# Patient Record
Sex: MALE | Race: WHITE | Employment: OTHER | URBAN - METROPOLITAN AREA
[De-identification: names, ages, dates, MRNs, and addresses within clinical notes are randomized per-mention and may not be internally consistent; named-entity substitution may affect disease eponyms.]

---

## 2022-04-11 ENCOUNTER — HOSPITAL ENCOUNTER (OUTPATIENT)
Dept: PHYSICAL THERAPY | Facility: CLINIC | Age: 77
Setting detail: THERAPIES SERIES
Discharge: HOME | End: 2022-04-11
Payer: MEDICARE

## 2022-04-11 PROCEDURE — 97110 THERAPEUTIC EXERCISES: CPT

## 2022-04-11 PROCEDURE — 97161 PT EVAL LOW COMPLEX 20 MIN: CPT

## 2022-04-11 NOTE — CONSULTS
[] [facility]) - [facility] &  Therapy  [address]  [phone]  F: [phone] [] [address]  [facility]   [address]  P: [phone]  F: [phone] [] [address] &  Therapy  [address]  P: [phone]  F: [phone] [x] [address]  P: [phone]  F: [phone] [] [address]  [facility]   [address]   [place]: [phone]  F: [phone]      Physical Therapy Upper Extremity Evaluation    Date:  2022  Patient: Jaylyn Jimenez  : 1945  MRN: [id number]  Physician: Cheli Elizondo   Insurance: MEDICARE  Medical Diagnosis:   S70.01XA (ICD-10-CM) - Contusion of right hip, initial encounter   S70.11XA (ICD-10-CM) - Contusion of right thigh, initial encounter  S40.011A (ICD-10-CM) - Contusion of right shoulder, initial encounter  Rehab Codes: M25. 511  Onset date: 3/31/2022    Next 's appt.: N/A    Subjective:     CC: Right shoulder pain s/p a fall      HPI: The patient states that he had fallen off a platform surface while at a local bar a few weeks ago. The patient reports no prior fall or gait instability. The patient had fallen onto his right hip and shoulder and denies hitting his head. The patient reports that he had bruising and soreness of the right hip and shoulder and is concerned that this may effect his golf game. No images were taken for this episode as symptoms were minor. Today, the patient only has symptoms into his right shoulder that is described as achy with certain movement (reaching). Patient reports that he has not been exercising because of fear of hurting it worse but would like to return to the gym.          PMHx: [] Unremarkable [x] Diabetes [] HTN  [] Pacemaker   [] MI/Heart Problems [] Cancer [] Arthritis [] Other:              [] Refer to full medical chart  In EPIC       Comorbidities:   [] Obesity [] Dialysis  [] N/A   [] Asthma/COPD [] Dementia [] Other:   [] Stroke [] Sleep apnea [] Other:   [] Vascular disease [] Rheumatic disease [] Other:     Tests: [] X-Ray: [] MRI:  [] Other:    Medications: [x] Refer to full medical record [] None [] Other:  Allergies:      [x] Refer to full medical record [] None [] Other:    Function:  Hand Dominance  [x] Right  [] Left  Patient lives with: Wife    In what type of home []  One story   [x] Two story   [] Split level   Number of stairs to enter 12   With handrail on the [x]  Right to enter   [] Left to enter   Bathroom has a []  Tub only  [x] Tub/shower combo   [] Walk in shower    []  Grab bars   Washing machine is on []  Main level   [] Second level   [] Basement   Employer Retired    Job Status []  Normal duty   [] Light duty   [] Off due to condition    [x]  Retired   [] Not employed   [] Disability  [] Other:  []  Return to work:    Work activities/duties        ADL/IADL Previous level of function Current level of function Modifications made  Who currently assists the patient with task   Bathing  [x] Independent  [] Assist [x] Independent  [] Assist     Dress/grooming [x] Independent  [] Assist [x] Independent  [] Assist     Transfer/mobility [x] Independent  [] Assist [x] Independent  [] Assist     Feeding [x] Independent  [] Assist [x] Independent  [] Assist     Toileting [x] Independent  [] Assist [x] Independent  [] Assist     Driving [x] Independent  [] Assist [x] Independent  [] Assist     Housekeeping [x] Independent  [] Assist [x] Independent  [] Assist     Grocery shop/meal prep [x] Independent  [] Assist [x] Independent  [] Assist       Gait Prior level of function Current level of function    [x] Independent  [] Assist [x] Independent  [] Assist   Device: [] Independent [] Independent    [] Straight Cane [] Quad cane [] Straight Cane [] Quad cane    [] Standard walker [] Rolling walker   [] 4 wheeled walker [] Standard walker [] Rolling walker   [] 4 wheeled walker    [] Wheelchair [] Wheelchair     Pain:  [x] Yes  [] No Location: Right shoulder, hip and thigh  Pain Rating: (0-10 scale) 0/10; 7/10 right after the fall  Pain altered Tx:  [] Yes  [] No  Action:    Symptoms:  [x] Improving [] Worsening [] Same  Better:  [] AM    [] PM    [] Sit    [] Rise/Sit    []Stand    [] Walk    [] Lying    [] Other:  Worse: [] AM    [] PM    [] Sit    [] Rise/Sit    []Stand    [] Walk    [] Lying    [] Bend                      [] Valsalva    [] Other:  Sleep: [x] OK    [] Disturbed    Objective:       OBSERVATION No Deficit Deficit Not Tested Comments   Forward Head [] [x] []    Rounded Shoulders [] [x] []    Kyphosis [] [] []    Scapular Height/Position [] [x] [] Right shoulder depressed    Winging [] [x] [] Min bilaterally    Scapulohumeral Rhythm [] [] [x]    INSPECTION/PALPATION       SC/AC Joint [x] [] []    Supraspinatus [x] [] []    Biceps tendon/groove [] [x] []    Posterior shld [] [x] []    Subscapularis [x] [] []    NEUROLOGICAL       Cervical ROM/Quadrant [x] [] []    Reflexes [x] [] []    Compression/Distraction [x] [] []    Sensation [x] [] []           ROM  °A/P END FEEL STRENGTH TESTS (+/-) Left Right Not Tested    Left Right  Left Right    []   Cervical       Cervical Compression      Flexion WNL     Cervical Distraction      Extension WNL     Spurling's A      Rotation WNL WNL    Rene       Side Bending WNL WNL    Neer's      Upper Extremity      Painful Arc  +    Shoulder flexion 150 150  4+ 4+ Hornblower's       Shoulder Abduction 150 150*  4+ 4+ Bearhug      Shoulder Extension      Lift off       Shoulder IR T10 T8  4+ 4+ Empty can      Shoulder ER 50 50  4+ 4+ Yergasons      Elbow Flexion      Speeds      Elbow Extension      Apprehension      Pronation      Crank       Supination      Biceps Load       Wrist flexion      O'Briens Wrist extension            Radial Deviation            Ulnar Deviation             Strength            Level 1:            Level 2:            Level 3:            Level 4:            Level 5:            Scapular             Upper Trap            Middle Trap            Rhomboids     3 3       Low Trap              Functional Test: UEFI Score: 22.5% functionally impaired     Comments:   Dynamic Gait Index = 24    Assessment: The patient is a 75 y/o male that presents with right shoulder pain following a fall. The patient presents with impairments of increased pain, painful ROM, decreased scapular strength, postural dysfunction and deconditioning. The patient signs and symptoms may be consistent with right shoulder impingement secondary to fall. The patient demonstrates a low fall risk as demonstrated by his dynamic gait index score. The patient will benefit from therapy services to address the above impairments to return to previous level of activity. Problems:    [x] ? Pain:  [x] ? ROM:  [x] ? Strength:  [x] ? Function:  [] Other:      STG: (to be met in 3 treatments)  1. ? Pain: Patient will decrease pain to < or = to 1/10.   2. ? ROM: Patient will demonstrate pain free symmetrical UE ROM   3. ? Strength: Patient will demonstrate bilateral rhomboid strength to < or = to 4/5.   4. ? Function: Patient will be able to return to recreational exercise. 5. Patient to be independent with home exercise program as demonstrated by performance with correct form without cues. 6. Demonstrate Knowledge of fall prevention  LTG: (to be met in 6 treatments)  1. Patient will be able to complete > or = to 25 golf swings without pain. 2. Patient will decrease UEFI score to < or = to 18% to improve QOL.                     Patient goals: Return to golf     Rehab Potential:  [x] Good  [] Fair  [] Poor   Suggested Professional Referral:  [x] No  [] Yes:  Barriers to Goal Achievement:  [x] No  [] Yes:  Domestic Concerns:  [x] No  [] Yes:    Pt. Education:  [x] Plans/Goals, Risks/Benefits discussed  [x] Home exercise program  Method of Education: [x] Verbal  [x] Demo  [x] Written    Access Code: [email or web address]  URL: [email or web address]  Date: 04/11/2022  Prepared by: Tamala Collet    Exercises  Seated Scapular Retraction - 1 x daily - 7 x weekly - 3 sets - 10 reps  Supine Shoulder Flexion with Dowel - 1 x daily - 7 x weekly - 3 sets - 10 reps  Shoulder External Rotation and Scapular Retraction with Resistance - 1 x daily - 7 x weekly - 3 sets - 10 reps      Comprehension of Education:  [x] Verbalizes understanding. [x] Demonstrates understanding. [] Needs Review. [x] Demonstrates/verbalizes understanding of HEP/Ed previously given. Treatment Plan:  [x] Therapeutic Exercise   46132  [] Iontophoresis: 4 mg/mL Dexamethasone Sodium Phosphate  mAmin  50509   [x] Therapeutic Activity  29622 [] Vasopneumatic cold with compression  43458    [] Gait Training   15016 [] Ultrasound   93205   [] Neuromuscular Re-education  73432 [] Electrical Stimulation Unattended  56960   [x] Manual Therapy  81775 [] Electrical Stimulation Attended  05733   [] Instruction in HEP  [] Lumbar/Cervical Traction  06975   [] Aquatic Therapy   01377 [] Cold/hotpack    [] Massage   69200      [] Dry Needling, 1 or 2 muscles  52235   [] Biofeedback, first 15 minutes   03193  [] Biofeedback, additional 15 minutes   81223 [] Dry Needling, 3 or more muscles  60119     []  Medication allergies reviewed for use of    Dexamethasone Sodium Phosphate 4mg/ml     with iontophoresis treatments. Pt is not allergic.        Frequency: 2 x/week for 6 visits    Todays Treatment:  Modalities:   Precautions:  Exercises:  Exercise Reps/ Time Weight/ Level Comments   Seated Scapular Retraction      Supine Shoulder Flexion with Dowel       Shoulder External Rotation and Scapular Retraction with Resistance      Patient education 5'  Educated the patient on the anatomy and physiology associated with their dysfunction, reviewed plan of care, HEP and answered all the patient's questions. HEP:   Access Code: [email or web address]  Seated Scapular Retraction - 1 x daily - 7 x weekly - 3 sets - 10 reps  Supine Shoulder Flexion with Dowel - 1 x daily - 7 x weekly - 3 sets - 10 reps  Shoulder External Rotation and Scapular Retraction with Resistance - 1 x daily - 7 x weekly - 3 sets - 10 reps      Other:    Specific Instructions for next treatment: Silvio Shyla if tolerated with minimal resistance, Review HEP, side lying scapular progression, progress to rows and shoulder extension as able. Evaluation Complexity:  History (Personal factors, comorbidities) [] 0 [] 1-2 [x] 3+   Exam (limitations, restrictions) [] 1-2 [] 3 [x] 4+   Clinical presentation (progression) [x] Stable [] Evolving  [] Unstable   Decision Making [x] Low [] Moderate [] High    [x] Low Complexity [] Moderate Complexity [] High Complexity       Treatment Charges: Mins Units   [x] Evaluation       [x]  Low       []  Moderate       []  High 30 1   []  Modalities     [x]  Ther Exercise 15 1   []  Manual Therapy     []  Ther Activities     []  Aquatics     []  Vasocompression     []  Other       TOTAL TREATMENT TIME: 45    Time in: 2:05 pm   Time Out: 2:55 pm    Electronically signed by: Tatiana Toledo PT        Physician Signature:________________________________Date:__________________  By signing above or cosigning this note, I have reviewed this plan of care and certify a need for medically necessary rehabilitation services.      *PLEASE SIGN ABOVE AND FAX BACK ALL PAGES*

## 2022-04-13 ENCOUNTER — HOSPITAL ENCOUNTER (OUTPATIENT)
Dept: PHYSICAL THERAPY | Facility: CLINIC | Age: 77
Setting detail: THERAPIES SERIES
Discharge: HOME | End: 2022-04-13
Payer: MEDICARE

## 2022-04-13 PROCEDURE — 97110 THERAPEUTIC EXERCISES: CPT

## 2022-04-13 PROCEDURE — 97016 VASOPNEUMATIC DEVICE THERAPY: CPT

## 2022-04-13 NOTE — FLOWSHEET NOTE
[] [facility]  [address]  [phone]  F: [phone] [] [address]  [place] [address]   [address]  P: [phone]  F: [phone] [] [place]  Therapy  [address]  P: [phone]  F: [phone] [x] [address]  P: [phone]  F: [phone] [] [address]  [facility]   [address]   [place]: [phone]  F: [phone]      Physical Therapy Daily Treatment Note    Date:  2022  Patient Name:  Jaylyn Jimenez    :  1945  MRN: [id number]  Physician: Cheli Elizondo          Insurance: MEDICARE  Medical Diagnosis:   S70.01XA (ICD-10-CM) - Contusion of right hip, initial encounter      S70.11XA (ICD-10-CM) - Contusion of right thigh, initial encounter  S40.011A (ICD-10-CM) - Contusion of right shoulder, initial encounter  Rehab Codes: M25. 511  Onset date: 3/31/2022                         Next 's appt.: N/A  Visit# / total visits: ; Progress note for Medicare patient due at visit 10    Cancels/No Shows: 0/0    Subjective:    Pain:  [x] Yes  [] No Location: Right Shoulder Pain Rating: (0-10 scale) 2 - 3/10  Pain altered Tx:  [] No  [] Yes  Action:  Comments: Patient states that he is slightly sore this date, was not able to get to the exercises as he would like. Has a dull ache in the right shoulder in the superior and posterior region.      Objective:  Modalities:   Precautions:  Exercises:  Exercise Reps/ Time Weight/ Level Comments   Warm - up      Pulley 2 / 2   Flexion / Abduction   UBE 3 / 3 2.5          Supine      Wand flexion 2 x 10     Wand ER 2 x 10                 Side lying       ER 2 x 10     Abd  2 x 10     Horz Abd 2 x 10     Flex  2 x 10     Seated       Scapular retraction 2 x 10 3\"                Standing                     HEP:  Access Code: [email or web address]  URL: [email or web address]  Date: 04/13/2022  Prepared by: Kelechi Simmons    Exercises  Seated Scapular Retraction - 1 x daily - 7 x weekly - 3 sets - 10 reps  Shoulder External Rotation and Scapular Retraction with Resistance - 1 x daily - 7 x weekly - 3 sets - 10 reps  Sidelying Shoulder External Rotation - 1 x daily - 7 x weekly - 2 sets - 10 reps  Sidelying Shoulder Abduction Palm Forward - 1 x daily - 7 x weekly - 2 sets - 10 reps  Sidelying Shoulder Horizontal Abduction - 1 x daily - 7 x weekly - 2 sets - 10 reps  Sidelying Shoulder Flexion 15 Degrees - 1 x daily - 7 x weekly - 2 sets - 10 reps      Specific Instructions for next treatment: UBE if tolerated with minimal resistance, Review HEP, side lying scapular progression, progress to rows and shoulder extension as able. Other:      Treatment Charges: Mins Units   []  Modalities     [x]  Ther Exercise 35 2   []  Manual Therapy     []  Ther Activities     []  Aquatics     [x]  Vasocompression 10 1   []  Other     Total Treatment time 45 3     Medicare: $ 181.91 4/13/2022    Assessment: [x] Progressing toward goals. Patient tolerated AAROM and UBE well therefore progressed to side lying scapular strengthening. Patient demonstrates fatigue following side lying exercises specifically external rotation. Finished the session with vaso compression to minimize post session soreness. [] No change. [] Other:  [x] Patient would continue to benefit from skilled physical therapy services in order to: The patient is a 75 y/o male that presents with right shoulder pain following a fall. The patient presents with impairments of increased pain, painful ROM, decreased scapular strength, postural dysfunction and deconditioning. The patient signs and symptoms may be consistent with right shoulder impingement secondary to fall.  The patient demonstrates a low fall risk as demonstrated by his dynamic gait index score. The patient will benefit from therapy services to address the above impairments to return to previous level of activity. STG/LTG  STG: (to be met in 3 treatments)  1. ? Pain: Patient will decrease pain to < or = to 1/10.   2. ? ROM: Patient will demonstrate pain free symmetrical UE ROM   3. ? Strength: Patient will demonstrate bilateral rhomboid strength to < or = to 4/5.   4. ? Function: Patient will be able to return to recreational exercise. 5. Patient to be independent with home exercise program as demonstrated by performance with correct form without cues. 6. Demonstrate Knowledge of fall prevention  LTG: (to be met in 6 treatments)  1. Patient will be able to complete > or = to 25 golf swings without pain. 2. Patient will decrease UEFI score to < or = to 18% to improve QOL.      Pt. Education:  [x] Yes  [] No  [x] Reviewed Prior HEP/Ed  Method of Education: [x] Verbal  [] Demo  [x] Written  Comprehension of Education:  [x] Verbalizes understanding. [x] Demonstrates understanding. [] Needs review. [x] Demonstrates/verbalizes HEP/Ed previously given. Plan: [x] Continue current frequency toward long and short term goals. [x] Specific Instructions for subsequent treatments: UBE if tolerated with minimal resistance, Review HEP, side lying scapular progression, progress to rows and shoulder extension as able. Time In: 11:00 pm            Time Out:  11:55 am    Electronically signed by:   Pj Bowie, PT

## 2022-04-18 ENCOUNTER — HOSPITAL ENCOUNTER (OUTPATIENT)
Dept: PHYSICAL THERAPY | Facility: CLINIC | Age: 77
Setting detail: THERAPIES SERIES
Discharge: HOME | End: 2022-04-18
Payer: MEDICARE

## 2022-04-18 PROCEDURE — 97110 THERAPEUTIC EXERCISES: CPT

## 2022-04-18 NOTE — FLOWSHEET NOTE
[] [facility]  [address]  [phone]  F: [phone] [] [address]  [place] [address]   [address]  P: [phone]  F: [phone] [] [place]  Therapy  [address]  P: [phone]  F: [phone] [x] [address]  P: [phone]  F: [phone] [] [address]  [facility]   [address]   [place]: [phone]  F: [phone]      Physical Therapy Daily Treatment Note    Date:  2022  Patient Name:  Darcy Dempsey    :  1945  MRN: [id number]  Physician: Romeo Quarles          Insurance: MEDICARE  Medical Diagnosis:   S70.01XA (ICD-10-CM) - Contusion of right hip, initial encounter      S70.11XA (ICD-10-CM) - Contusion of right thigh, initial encounter  S40.011A (ICD-10-CM) - Contusion of right shoulder, initial encounter  Rehab Codes: M25. 511  Onset date: 3/31/2022                         Next 's appt.: N/A  Visit# / total visits: ; Progress note for Medicare patient due at visit 10    Cancels/No Shows: 0/0    Subjective:    Pain:  [x] Yes  [] No Location: Right Shoulder Pain Rating: (0-10 scale) 2 - 3/10  Pain altered Tx:  [] No  [] Yes  Action:  Comments: Patient states that he has not had much pain since last session. The exercises improve his pain.      Objective:  Modalities:   Precautions:  Exercises:  Exercise Reps/ Time Weight/ Level Comments   Warm - up      Pulley 2 / 2   Flexion / Abduction   UBE 3 / 3 2.5          Supine      Wand flexion 2 x 10     Wand ER 2 x 10     Single knee to chest 2 x 30\"     Piriformis stretch 2 x 30\"     Hip adduction isometric  2 x 10 3\"    Hip abduction isometric  2 x 10 3\"    Side lying       ER 2 x 10     Abd  2 x 10     Horz Abd 2 x 10     Flex  2 x 10     Seated       Scapular retraction 2 x 10 3\"                Standing       Rows  2 x 10 Fiatt    Shoulder Extension  2 x 10 Fiatt      HEP:  Access Code: [email or web address]  URL: [email or web address]  Date: 04/18/2022  Prepared by: Rozina Temple    Exercises  Shoulder External Rotation and Scapular Retraction with Resistance - 1 x daily - 7 x weekly - 3 sets - 10 reps  Sidelying Shoulder External Rotation - 1 x daily - 7 x weekly - 2 sets - 10 reps  Sidelying Shoulder Abduction Palm Forward - 1 x daily - 7 x weekly - 2 sets - 10 reps  Sidelying Shoulder Horizontal Abduction - 1 x daily - 7 x weekly - 2 sets - 10 reps  Sidelying Shoulder Flexion 15 Degrees - 1 x daily - 7 x weekly - 2 sets - 10 reps  Standing Shoulder Row with Anchored Resistance - 1 x daily - 7 x weekly - 2 sets - 10 reps  Shoulder extension with resistance - Neutral - 1 x daily - 7 x weekly - 2 sets - 10 reps      Specific Instructions for next treatment: UBE if tolerated with minimal resistance, Review HEP, side lying scapular progression, progress to rows and shoulder extension as able. Other:      Treatment Charges: Mins Units   []  Modalities     [x]  Ther Exercise 35 2   []  Manual Therapy     []  Ther Activities     []  Aquatics     [x]  Vasocompression 10 1   []  Other     Total Treatment time 45 3     Medicare: $ 256.8 4/18/2022    Assessment: [x] Progressing toward goals. Improved tolerance to side lying exercises therefore added standing rows and shoulder extension to improve scapular strength / endurance. Patient had c/o hip soreness this date therefore completed supine stretches and isometrics with symptom relief. Will continue to progress right shoulder strength as tolerated. [] No change. [] Other:  [x] Patient would continue to benefit from skilled physical therapy services in order to: The patient is a 75 y/o male that presents with right shoulder pain following a fall.  The patient presents with impairments of increased pain, painful ROM, decreased scapular strength, postural dysfunction and deconditioning. The patient signs and symptoms may be consistent with right shoulder impingement secondary to fall. The patient demonstrates a low fall risk as demonstrated by his dynamic gait index score. The patient will benefit from therapy services to address the above impairments to return to previous level of activity. STG/LTG  STG: (to be met in 3 treatments)  1. ? Pain: Patient will decrease pain to < or = to 1/10.   2. ? ROM: Patient will demonstrate pain free symmetrical UE ROM   3. ? Strength: Patient will demonstrate bilateral rhomboid strength to < or = to 4/5.   4. ? Function: Patient will be able to return to recreational exercise. 5. Patient to be independent with home exercise program as demonstrated by performance with correct form without cues. 6. Demonstrate Knowledge of fall prevention  LTG: (to be met in 6 treatments)  1. Patient will be able to complete > or = to 25 golf swings without pain. 2. Patient will decrease UEFI score to < or = to 18% to improve QOL.      Pt. Education:  [x] Yes  [] No  [x] Reviewed Prior HEP/Ed  Method of Education: [x] Verbal  [] Demo  [x] Written  Comprehension of Education:  [x] Verbalizes understanding. [x] Demonstrates understanding. [] Needs review. [x] Demonstrates/verbalizes HEP/Ed previously given. Plan: [x] Continue current frequency toward long and short term goals. [x] Specific Instructions for subsequent treatments: UBE, Side lying exercises with weight, standing rows / extension, row with rotation, thoracic rotation. Time In: 2:00 pm            Time Out:  2:50 pm    Electronically signed by:   Eulalio Rincon PT

## 2022-04-20 ENCOUNTER — HOSPITAL ENCOUNTER (OUTPATIENT)
Dept: PHYSICAL THERAPY | Facility: CLINIC | Age: 77
Setting detail: THERAPIES SERIES
Discharge: HOME | End: 2022-04-20
Payer: MEDICARE

## 2022-04-20 PROCEDURE — 97110 THERAPEUTIC EXERCISES: CPT

## 2022-04-20 NOTE — FLOWSHEET NOTE
[] [facility] [address]  [address]  [phone]  F: [phone] [] [address]  Kl\[facility] [place] [facility]\"" 36   [address]  P: [phone]  F: [phone] [] [place] [address]  Therapy  [address]  P: [phone]  F: [phone] [x] [address]  P: [phone]  F: [phone] [] [address]  [facility]   [address]   [place]: [phone]  F: [phone]      Physical Therapy Daily Treatment Note    Date:  2022  Patient Name:  Fidelia Rothman    :  1945  MRN: [id number]  Physician: Yeison Kee          Insurance: MEDICARE  Medical Diagnosis:   S70.01XA (ICD-10-CM) - Contusion of right hip, initial encounter      S70.11XA (ICD-10-CM) - Contusion of right thigh, initial encounter  S40.011A (ICD-10-CM) - Contusion of right shoulder, initial encounter  Rehab Codes: M25. 511  Onset date: 3/31/2022                         Next 's appt.: N/A  Visit# / total visits: 3/6; Progress note for Medicare patient due at visit 10    Cancels/No Shows: 0/0    Subjective:    Pain:  [x] Yes  [] No Location: Right Shoulder Pain Rating: (0-10 scale) 0/10  Pain altered Tx:  [] No  [] Yes  Action:  Comments: Patient states that his shoulder has been feeling well, no pain this date. He was unable to complete his exercises since last visit.      Objective:  Modalities:   Precautions:  Exercises:  Exercise Reps/ Time Weight/ Level Comments   Warm - up      Pulley 2 / 2   Flexion / Abduction   UBE 3 / 3 2.5          Supine      Wand flexion 2 x 10     Wand ER 2 x 10     Single knee to chest 2 x 30\"     Piriformis stretch 2 x 30\"     Hip adduction isometric  2 x 10 3\"    Hip abduction isometric  2 x 10 3\"    Side lying       ER 2 x 10     Abd  2 x 10 Arnol Abd 2 x 10     Flex  2 x 10     Seated       Scapular retraction 2 x 10 3\"                Standing       Rows  2 x 10 [place]    Shoulder Extension  2 x 10 [place]    Row with thoracic rotation 2 x 10 red      HEP:  Access Code: [email or web address]  URL: [email or web address]  Date: 04/18/2022  Prepared by: Mallory Solares    Exercises  Shoulder External Rotation and Scapular Retraction with Resistance - 1 x daily - 7 x weekly - 3 sets - 10 reps  Sidelying Shoulder External Rotation - 1 x daily - 7 x weekly - 2 sets - 10 reps  Sidelying Shoulder Abduction Palm Forward - 1 x daily - 7 x weekly - 2 sets - 10 reps  Sidelying Shoulder Horizontal Abduction - 1 x daily - 7 x weekly - 2 sets - 10 reps  Sidelying Shoulder Flexion 15 Degrees - 1 x daily - 7 x weekly - 2 sets - 10 reps  Standing Shoulder Row with Anchored Resistance - 1 x daily - 7 x weekly - 2 sets - 10 reps  Shoulder extension with resistance - Neutral - 1 x daily - 7 x weekly - 2 sets - 10 reps      Specific Instructions for next treatment: UBE if tolerated with minimal resistance, Review HEP, side lying scapular progression, progress to rows and shoulder extension as able. Other:      Treatment Charges: Mins Units   []  Modalities     [x]  Ther Exercise 40 3   []  Manual Therapy     []  Ther Activities     []  Aquatics     [x]  Vasocompression     []  Other     Total Treatment time 40 3     Medicare: $331.69 4/20/2022    Assessment: [x] Progressing toward goals. Patient presents with improved pain symptoms of the right shoulder. Minimal progressions made this date due to poor HEP compliance since last visit. Added row with thoracic rotation to increase thoracic mobility and scapular strength. Right hip noted to have decreased mobility and mild pain symptoms which improved with supine stretches. Will progress as tolerated. [] No change. [] Other:  [x] Patient would continue to benefit from skilled physical therapy services in order to:  The patient is a 75 y/o male that presents with right shoulder pain following a fall. The patient presents with impairments of increased pain, painful ROM, decreased scapular strength, postural dysfunction and deconditioning. The patient signs and symptoms may be consistent with right shoulder impingement secondary to fall. The patient demonstrates a low fall risk as demonstrated by his dynamic gait index score. The patient will benefit from therapy services to address the above impairments to return to previous level of activity. STG/LTG  STG: (to be met in 3 treatments)  1. ? Pain: Patient will decrease pain to < or = to 1/10.   2. ? ROM: Patient will demonstrate pain free symmetrical UE ROM   3. ? Strength: Patient will demonstrate bilateral rhomboid strength to < or = to 4/5.   4. ? Function: Patient will be able to return to recreational exercise. 5. Patient to be independent with home exercise program as demonstrated by performance with correct form without cues. 6. Demonstrate Knowledge of fall prevention  LTG: (to be met in 6 treatments)  1. Patient will be able to complete > or = to 25 golf swings without pain. 2. Patient will decrease UEFI score to < or = to 18% to improve QOL.      Pt. Education:  [x] Yes  [] No  [x] Reviewed Prior HEP/Ed  Method of Education: [x] Verbal  [] Demo  [x] Written  Comprehension of Education:  [x] Verbalizes understanding. [x] Demonstrates understanding. [] Needs review. [x] Demonstrates/verbalizes HEP/Ed previously given. Plan: [x] Continue current frequency toward long and short term goals. [x] Specific Instructions for subsequent treatments: UBE, Side lying exercises with weight, standing rows / extension, row with rotation, thoracic rotation. Time In: 11:00 am            Time Out:  11:50 am    Electronically signed by:   Aviva Suárez, PT

## 2022-04-25 ENCOUNTER — HOSPITAL ENCOUNTER (OUTPATIENT)
Dept: PHYSICAL THERAPY | Facility: CLINIC | Age: 77
Setting detail: THERAPIES SERIES
Discharge: HOME | End: 2022-04-25
Payer: MEDICARE

## 2022-04-25 PROCEDURE — 97110 THERAPEUTIC EXERCISES: CPT

## 2022-04-25 NOTE — FLOWSHEET NOTE
[] [facility]) [facility] &  Therapy  [address]  [phone]  F: [phone] [] [address]  [address]   [address]  P: [phone]  F: [phone] [] [place] [address]  Therapy  [address]  P: [phone]  F: [phone] [x] [address]  P: [phone]  F: [phone] [] [address]  [facility]   [address]   [place]: [phone]  F: [phone]      Physical Therapy Daily Treatment Note    Date:  2022  Patient Name:  Arpita Acosta    :  1945  MRN: [id number]  Physician: Maik Quesada          Insurance: MEDICARE  Medical Diagnosis:   S70.01XA (ICD-10-CM) - Contusion of right hip, initial encounter      S70.11XA (ICD-10-CM) - Contusion of right thigh, initial encounter  S40.011A (ICD-10-CM) - Contusion of right shoulder, initial encounter  Rehab Codes: M25. 511  Onset date: 3/31/2022                         Next 's appt.: N/A  Visit# / total visits:; Progress note for Medicare patient due at visit 10    Cancels/No Shows: 0/0    Subjective:    Pain:  [x] Yes  [] No Location: Right Shoulder Pain Rating: (0-10 scale) 0/10  Pain altered Tx:  [] No  [] Yes  Action:  Comments: Patient states that he is not having much pain. He does have a little pain in the morning which improves in the morning.      Objective:  Modalities:   Precautions:  Exercises:  Exercise Reps/ Time Weight/ Level Comments Completed    Warm - up       Pulley 2 / 2   Flexion / Abduction    UBE 4 / 4 2.5  x          Supine       Wand flexion 2 x 10   -   Wand ER 2 x 10   -   Single knee to chest 2 x 30\"   -   Piriformis stretch 2 x 30\"   -   Hip adduction isometric  2 x 10 3\"  -   Hip abduction isometric  2 x 10 3\"  -   Side lying        ER 2 x 10 1#  x   Abd  2 x 10 1#  x   Horz Abd 2 x 10 1#  x   Flex  2 x 10 1#  x   Seated        Scapular retraction 2 x 10 3\"  -   Thoracic rotation with prachi  2 x 10   x          Standing        Rows  2 x 10 [place]  x   Shoulder Extension  2 x 10 [place]  x   Row with thoracic rotation 2 x 10 red  x     HEP:  Access Code: [email or web address]  URL: [email or web address]/  Date: 04/18/2022  Prepared by: Leonor Richter    Exercises  Shoulder External Rotation and Scapular Retraction with Resistance - 1 x daily - 7 x weekly - 3 sets - 10 reps  Sidelying Shoulder External Rotation - 1 x daily - 7 x weekly - 2 sets - 10 reps  Sidelying Shoulder Abduction Palm Forward - 1 x daily - 7 x weekly - 2 sets - 10 reps  Sidelying Shoulder Horizontal Abduction - 1 x daily - 7 x weekly - 2 sets - 10 reps  Sidelying Shoulder Flexion 15 Degrees - 1 x daily - 7 x weekly - 2 sets - 10 reps  Standing Shoulder Row with Anchored Resistance - 1 x daily - 7 x weekly - 2 sets - 10 reps  Shoulder extension with resistance - Neutral - 1 x daily - 7 x weekly - 2 sets - 10 reps      Specific Instructions for next treatment: UBE if tolerated with minimal resistance, Review HEP, side lying scapular progression, progress to rows and shoulder extension as able. Other:      Treatment Charges: Mins Units   []  Modalities     [x]  Ther Exercise 40 3   []  Manual Therapy     []  Ther Activities     []  Aquatics     [x]  Vasocompression     []  Other     Total Treatment time 40 3     Medicare: $406.58 4/25/2022    Assessment: [x] Progressing toward goals. Patient demonstrates improved right shoulder strength and endurance therefore added resistance during side lying scapular strengthening progression. In addition, added resistance to standing scapular exercises to progress strengthening. Added seated thoracic rotation to improve thoracic mobility in the transverse plane to decrease demand of right shoulder during the golf swing. [] No change.      [] Other:  [x] Patient would continue to benefit from skilled physical therapy services in order to: The patient is a 77 y/o male that presents with right shoulder pain following a fall. The patient presents with impairments of increased pain, painful ROM, decreased scapular strength, postural dysfunction and deconditioning. The patient signs and symptoms may be consistent with right shoulder impingement secondary to fall. The patient demonstrates a low fall risk as demonstrated by his dynamic gait index score. The patient will benefit from therapy services to address the above impairments to return to previous level of activity. STG/LTG  STG: (to be met in 3 treatments)  1. ? Pain: Patient will decrease pain to < or = to 1/10.   2. ? ROM: Patient will demonstrate pain free symmetrical UE ROM   3. ? Strength: Patient will demonstrate bilateral rhomboid strength to < or = to 4/5.   4. ? Function: Patient will be able to return to recreational exercise. 5. Patient to be independent with home exercise program as demonstrated by performance with correct form without cues. 6. Demonstrate Knowledge of fall prevention  LTG: (to be met in 6 treatments)  1. Patient will be able to complete > or = to 25 golf swings without pain. 2. Patient will decrease UEFI score to < or = to 18% to improve QOL.      Pt. Education:  [x] Yes  [] No  [x] Reviewed Prior HEP/Ed  Method of Education: [x] Verbal  [] Demo  [x] Written  Comprehension of Education:  [x] Verbalizes understanding. [x] Demonstrates understanding. [] Needs review. [x] Demonstrates/verbalizes HEP/Ed previously given. Plan: [x] Continue current frequency toward long and short term goals. [x] Specific Instructions for subsequent treatments: UBE, Side lying exercises with weight, standing rows / extension, row with rotation, thoracic rotation. Time In: 11:00 am            Time Out:  11:50 am    Electronically signed by:   Rozina Temple, PT

## 2022-04-27 ENCOUNTER — HOSPITAL ENCOUNTER (OUTPATIENT)
Dept: PHYSICAL THERAPY | Facility: CLINIC | Age: 77
Setting detail: THERAPIES SERIES
Discharge: HOME | End: 2022-04-27
Payer: MEDICARE

## 2022-04-27 PROCEDURE — 97110 THERAPEUTIC EXERCISES: CPT

## 2022-04-27 PROCEDURE — 97530 THERAPEUTIC ACTIVITIES: CPT

## 2022-04-27 NOTE — PROGRESS NOTES
[] [facility]) [facility] &  Therapy  [address]  [phone]  F: [phone] [] [address]  [place] [address]   [address]  P: [phone]  F: [phone] [] [place]  [address]  P: [phone]  F: [phone] [] [address]  P: [phone]  F: [phone] [] [address]  [facility]   [address]   [place]: [phone]  F: [phone]      Physical Therapy Progress Note    Date: 2022      Patient: Arpita Acosta  : 1945  MRN: [id number]    Physician: Rehana Bangura          Insurance: MEDICARE  Medical Diagnosis:   S70. 01XA (ICD-10-CM) - Contusion of right hip, initial encounter      S70.11XA (ICD-10-CM) - Contusion of right thigh, initial encounter  S40.011A (ICD-10-CM) - Contusion of right shoulder, initial encounter  Rehab Codes: M25. 511  Onset date: 3/31/2022                         Next 's appt.: N/A  Visit# / total visits: ; Progress note for Medicare patient due at visit 10                          Cancels/No Shows: 0/0      Subjective:  Pain:  [] Yes  [] No  Location: Right Shoulder Pain Rating: (0-10 scale) 0 - 2/10  Pain altered Tx:  [] No  [] Yes  Action:  Comments: Patient states that he has mild soreness in the right shoulder after waking up in the morning that improves with his exercises. The patient has yet to start returning to the gym or golfing.      Objective:       ROM  °A/P END FEEL STRENGTH     Left Right   Left Right   Cervical              Flexion WNL           Extension WNL           Rotation WNL WNL         Side Bending WNL WNL         Upper Extremity             Shoulder flexion 150 150   4+ 4+   Shoulder Abduction 150 150   4+ 4+   Shoulder Extension             Shoulder IR T10 T8   4+ 4+   Shoulder ER 50 50   4+ 4+   Scapular              Upper Trap             Middle Trap             Rhomboids        3 3   Low Trap                      Assessment:  The patient is a 75 y/o male that originally presented to therapy with a c/o right shoulder pain. The patient presents with improvements in right shoulder ROM and strength. The patient still demonstrates impairments of decreased right shoulder endurance and balance as observed when assessing the patients golf swing. The patient will benefit from continued therapy services to address the above impairments and return to recreational exercise and golf. STG: (to be met in 3 treatments)  1. ? Pain: Patient will decrease pain to < or = to 1/10.     Progress: stiff when he gets up in the morning 2/10  2. ? ROM: Patient will demonstrate pain free symmetrical UE ROM     Met  3. ? Strength: Patient will demonstrate bilateral rhomboid strength to < or = to 4/5. Met   4. ? Function: Patient will be able to return to recreational exercise.     Progress: has not returned to gym but feels comfortable to return   5. Patient to be independent with home exercise program as demonstrated by performance with correct form without cues. Met  6. Demonstrate Knowledge of fall prevention  LTG: (to be met in 6 treatments)  1. Patient will be able to complete > or = to 25 golf swings without pain. Not Met: pain and fatigue noted    2. Patient will decrease UEFI score to < or = to 18% to improve QOL.        Treatment:      Modalities:   Precautions:  Exercises:  Exercise Reps/ Time Weight/ Level Comments Completed    Warm - up           Pulley 2 / 2    Flexion / Abduction     UBE 3 / 3 2.5   x               Supine           Wand flexion 2 x 10     -   Wand ER 2 x 10     -   Single knee to chest 2 x 30\"     -   Piriformis stretch 2 x 30\"     -   Hip adduction isometric  2 x 10 3\"   -   Hip abduction isometric  2 x 10 3\"   -   Lumbar rotation 2 x 10   x Side lying            ER 2 x 10 1#   x   Abd  2 x 10 1#   x   Horz Abd 2 x 10 1#   x   Flex  2 x 10 1#   x   Seated            Scapular retraction 2 x 10 3\"   -   Thoracic rotation with prachi  2 x 10     x               Standing            Rows  2 x 10 Orange   x   Shoulder Extension  2 x 10 [place]   x   Row with thoracic rotation 2 x 10 red   x      HEP:  Access Code: [email or web address]  URL: [email or web address]  Date: 04/27/2022  Prepared by: Mallory Solares    Program Notes  Add a soup can to the exercises that we are doing on our side       Exercises  Sidelying Shoulder External Rotation - 1 x daily - 7 x weekly - 2 sets - 10 reps  Sidelying Shoulder Abduction Palm Forward - 1 x daily - 7 x weekly - 2 sets - 10 reps  Sidelying Shoulder Horizontal Abduction - 1 x daily - 7 x weekly - 2 sets - 10 reps  Sidelying Shoulder Flexion 15 Degrees - 1 x daily - 7 x weekly - 2 sets - 10 reps  Standing Shoulder Row with Anchored Resistance - 1 x daily - 7 x weekly - 2 sets - 15 reps  Shoulder extension with resistance - Neutral - 1 x daily - 7 x weekly - 2 sets - 15 reps  Supine Lower Trunk Rotation - 1 x daily - 7 x weekly - 2 sets - 10 reps  Supine Piriformis Stretch with Foot on Ground - 1 x daily - 7 x weekly - 3 sets - 30 sec hold  Seated thoracic rotation - 1 x daily - 7 x weekly - 2 sets - 10 reps  Tandem Stance in Corner - 1 x daily - 7 x weekly - 3 sets - 10 reps        Specific Instructions for next treatment: Continue side lying progression, continue standing scapular strengthening, continue thoracic rotation (add resistance), address hip as needed. Other:      Treatment Charges: Mins Units   []  Modalities     [x]  Ther Exercise 30 2   []  Manual Therapy     [x]  Ther Activities 15 1   []  Aquatics     []  Vasocompression     []  Other     Total Treatment time 45 3      Pt.  Education:  [x] Yes  [] No  [x] Reviewed Prior HEP/Ed  Method of Education: [x] Verbal  [x] Demo  [x] Written    Access Code: [email or web address]  URL: [email or web address]  Date: 04/27/2022  Prepared by: Darryn Ureña    Program Notes  Add a soup can to the exercises that we are doing on our side       Exercises  Sidelying Shoulder External Rotation - 1 x daily - 7 x weekly - 2 sets - 10 reps  Sidelying Shoulder Abduction Palm Forward - 1 x daily - 7 x weekly - 2 sets - 10 reps  Sidelying Shoulder Horizontal Abduction - 1 x daily - 7 x weekly - 2 sets - 10 reps  Sidelying Shoulder Flexion 15 Degrees - 1 x daily - 7 x weekly - 2 sets - 10 reps  Standing Shoulder Row with Anchored Resistance - 1 x daily - 7 x weekly - 2 sets - 15 reps  Shoulder extension with resistance - Neutral - 1 x daily - 7 x weekly - 2 sets - 15 reps  Supine Lower Trunk Rotation - 1 x daily - 7 x weekly - 2 sets - 10 reps  Supine Piriformis Stretch with Foot on Ground - 1 x daily - 7 x weekly - 3 sets - 30 sec hold  Seated thoracic rotation - 1 x daily - 7 x weekly - 2 sets - 10 reps  Tandem Stance in Corner - 1 x daily - 7 x weekly - 3 sets - 10 reps      Comprehension of Education:  [x] Verbalizes understanding. [x] Demonstrates understanding. [] Needs review. [x] Demonstrates/verbalizes HEP/Ed previously given. Plan: [x] Continue current frequency toward long and short term goals. [x] Specific Instructions for subsequent treatments: Continue side lying progression, continue standing scapular strengthening, continue thoracic rotation (add resistance), address hip as needed.        Time In: 11:05 am            Time Out: 12:00 pm      Treatment Plan:  [x] Therapeutic Exercise   24378  [] Iontophoresis: 4 mg/mL Dexamethasone Sodium Phosphate  mAmin  76279   [x] Therapeutic Activity  06783 [x] Vasopneumatic cold with compression  76827    [] Gait Training   11835 [] Ultrasound   36832   [x] Neuromuscular Re-education  33898 [] Electrical Stimulation Unattended  63710   [x] Manual Therapy  79499 [] Electrical Stimulation Attended  52990   [x] Instruction in HEP  [] Lumbar/Cervical Traction  [id number]   [] Aquatic Therapy   [id number] [] Cold/hotpack    [] Massage   [id number]      [] Dry Needling, 1 or 2 muscles  83022   [] Biofeedback, first 15 minutes   01095  [] Biofeedback, additional 15 minutes   80423 [] Dry Needling, 3 or more muscles  03020       Patient Status:     [] Continue per initial plan of care. [x] Additional visits necessary. [x] Other: Added 2 additional visits to the patient's plan of care to improve right shoulder endurance and improve balance. Requested Frequency/Duration: 1 times per week for 8 total treatments. Electronically signed by Pj Bowie PT on 4/27/2022 at 11:12 AM      If you have any questions or concerns, please don't hesitate to call. Thank you for your referral.    Physician Signature:________________________________Date:__________________  By signing above or cosigning this note, I have reviewed this plan of care and certify a need for medically necessary rehabilitation services.      *PLEASE SIGN ABOVE AND FAX BACK ALL PAGES*

## 2022-05-04 ENCOUNTER — HOSPITAL ENCOUNTER (OUTPATIENT)
Dept: PHYSICAL THERAPY | Facility: CLINIC | Age: 77
Setting detail: THERAPIES SERIES
Discharge: HOME | End: 2022-05-04
Payer: MEDICARE

## 2022-05-04 PROCEDURE — 97110 THERAPEUTIC EXERCISES: CPT

## 2022-05-04 NOTE — FLOWSHEET NOTE
[] [facility]  [address]  [phone]  F: [phone] [] [address]  [place]   [address]  P: [phone]  F: [phone] [] [address]  Therapy  [address]  P: [phone]  F: [phone] [x] [address]  P: [phone]  F: [phone] [] [address]  [facility]   [address]   [place]: [phone]  F: [phone]      Physical Therapy Daily Treatment Note    Date:  2022  Patient Name:  Estrella Serrato    :  1945  MRN: [id number]  Physician: Emi Snyder          Insurance: MEDICARE  Medical Diagnosis:   S70.01XA (ICD-10-CM) - Contusion of right hip, initial encounter      S70.11XA (ICD-10-CM) - Contusion of right thigh, initial encounter  S40.011A (ICD-10-CM) - Contusion of right shoulder, initial encounter  Rehab Codes: M25. 511  Onset date: 3/31/2022                         Next 's appt.: N/A  Visit# / total visits: ; Progress note for Medicare patient due at visit 10    Cancels/No Shows: 0/0    Subjective:    Pain:  [x] Yes  [] No Location: Right Shoulder Pain Rating: (0-10 scale) 0/10  Pain altered Tx:  [] No  [] Yes  Action:  Comments: Patient states he is slightly sore this date but states it is muscle soreness.      Objective:  Modalities:   Precautions:  Exercises:  Exercise Reps/ Time Weight/ Level Comments Completed    Warm - up           Pulley 2 / 2    Flexion / Abduction     UBE 3 / 3 3   x               Supine           Wand flexion 2 x 10     -   Wand ER 2 x 10     -   Single knee to chest 2 x 30\"     -   Piriformis stretch 2 x 30\"     -   Hip adduction isometric  2 x 10 3\"   -   Hip abduction isometric  2 x 10 3\"   -   Lumbar rotation 2 x 10                    Side lying            ER 2 x 10 2#   x   Abd  2 x 10 2#   x   Horz Abd 2 x 10 2#   x   Flex  2 x 10 2#   x   Seated            Scapular retraction 2 x 10 3\"   -   Thoracic rotation with prachi  2 x 10     x               Standing            Rows  2 x 10 Yellow   x   Shoulder Extension  2 x 10 Yellow   x   Row with thoracic rotation 2 x 10 red   -   Trunk rotation  2 x 10 Red   x      HEP:  Access Code: [email or web address]  URL: [email or web address]  Date: 04/27/2022  Prepared by: Von Jimenez     Program Notes  Add a soup can to the exercises that we are doing on our side         Exercises  Sidelying Shoulder External Rotation - 1 x daily - 7 x weekly - 2 sets - 10 reps  Sidelying Shoulder Abduction Palm Forward - 1 x daily - 7 x weekly - 2 sets - 10 reps  Sidelying Shoulder Horizontal Abduction - 1 x daily - 7 x weekly - 2 sets - 10 reps  Sidelying Shoulder Flexion 15 Degrees - 1 x daily - 7 x weekly - 2 sets - 10 reps  Standing Shoulder Row with Anchored Resistance - 1 x daily - 7 x weekly - 2 sets - 15 reps  Shoulder extension with resistance - Neutral - 1 x daily - 7 x weekly - 2 sets - 15 reps  Supine Lower Trunk Rotation - 1 x daily - 7 x weekly - 2 sets - 10 reps  Supine Piriformis Stretch with Foot on Ground - 1 x daily - 7 x weekly - 3 sets - 30 sec hold  Seated thoracic rotation - 1 x daily - 7 x weekly - 2 sets - 10 reps  Tandem Stance in Corner - 1 x daily - 7 x weekly - 3 sets - 10 reps         Specific Instructions for next treatment: Continue side lying progression, continue standing scapular strengthening, continue thoracic rotation (add resistance), address hip as needed. Other:      Treatment Charges: Mins Units   []  Modalities     [x]  Ther Exercise 45 3   []  Manual Therapy     []  Ther Activities     []  Aquatics     [x]  Vasocompression     []  Other     Total Treatment time 45 3     Medicare: $564.61 5/4/2022    Assessment: [x] Progressing toward goals.  Patient demonstrates improved right RTC and scapular endurance observed through improved tolerance to side lying progression with increased resistance. Patient has improved static balance in the tandem stance position therefore progressed to foam pad and dynamic movement while in tandem stance. Added resistance to trunk rotation and added to HEP. Will continue to progress and will consider d/c next treatment session. [] No change. [] Other:  [x] Patient would continue to benefit from skilled physical therapy services in order to: The patient is a 75 y/o male that presents with right shoulder pain following a fall. The patient presents with impairments of increased pain, painful ROM, decreased scapular strength, postural dysfunction and deconditioning. The patient signs and symptoms may be consistent with right shoulder impingement secondary to fall. The patient demonstrates a low fall risk as demonstrated by his dynamic gait index score. The patient will benefit from therapy services to address the above impairments to return to previous level of activity. STG/LTG  STG: (to be met in 3 treatments)  1. ? Pain: Patient will decrease pain to < or = to 1/10.   2. ? ROM: Patient will demonstrate pain free symmetrical UE ROM   3. ? Strength: Patient will demonstrate bilateral rhomboid strength to < or = to 4/5.   4. ? Function: Patient will be able to return to recreational exercise. 5. Patient to be independent with home exercise program as demonstrated by performance with correct form without cues. 6. Demonstrate Knowledge of fall prevention  LTG: (to be met in 6 treatments)  1. Patient will be able to complete > or = to 25 golf swings without pain. 2. Patient will decrease UEFI score to < or = to 18% to improve QOL.      Pt. Education:  [x] Yes  [] No  [x] Reviewed Prior HEP/Ed  Method of Education: [x] Verbal  [] Demo  [x] Written  Comprehension of Education:  [x] Verbalizes understanding. [x] Demonstrates understanding. [] Needs review.   [x] Demonstrates/verbalizes HEP/Ed previously given. Plan: [x] Continue current frequency toward long and short term goals. [x] Specific Instructions for subsequent treatments: UBE, Side lying exercises with weight, standing rows / extension, row with rotation, thoracic rotation. Time In: 10:30 am            Time Out:  11:20 am    Electronically signed by:   Lachelle Stevens PT

## 2022-05-11 ENCOUNTER — HOSPITAL ENCOUNTER (OUTPATIENT)
Dept: PHYSICAL THERAPY | Facility: CLINIC | Age: 77
Setting detail: THERAPIES SERIES
Discharge: HOME | End: 2022-05-11
Payer: MEDICARE

## 2022-05-11 PROCEDURE — 97530 THERAPEUTIC ACTIVITIES: CPT

## 2022-05-11 NOTE — DISCHARGE SUMMARY
[] [facility] [facility]  [address]  [phone]  F: [phone] [] [address]  [place] [address]   [address]  P: [phone]  F: [phone] [] [place]  [address]  P: [phone]  F: [phone] [x] [address]  P: [phone]  F: [phone] [] [address]  [facility]   [address]   [place]: [phone]  F: [phone]      Physical Therapy Discharge Note    Date: 2022      Patient: Sukumar Garcia  : 1945  MRN: [id number]    Physician: Junior Oconnell          Insurance: MEDICARE  Medical Diagnosis:   S70. 01XA (ICD-10-CM) - Contusion of right hip, initial encounter      S70.11XA (ICD-10-CM) - Contusion of right thigh, initial encounter  S40.011A (ICD-10-CM) - Contusion of right shoulder, initial encounter  Rehab Codes: M25. 511  Onset date: 3/31/2022                         Next 's appt.: N/A  Visit# / total visits: ; Progress note for Medicare patient due at visit 10                              Subjective:  Pain:  []? Yes  []? No               Location: Right Shoulder        Pain Rating: (0-10 scale) 0 /10  Pain altered Tx:  []? No  []? Yes  Action:  Comments: Patient states that he has not been experiencing any pain but does have mild stiffness in the morning.  When the pain does occur he is able to completing     Objective:   ROM  °A/P END FEEL STRENGTH     Left Right   Left Right   Cervical              Flexion WNL           Extension WNL           Rotation WNL WNL         Side Bending WNL WNL         Upper Extremity             Shoulder flexion 150 150   4+ 4+   Shoulder Abduction 150 150   4+ 4+   Shoulder Extension             Shoulder IR T10 T8   4+ 4+   Shoulder ER 50 50   4+ 4+ Scapular              Upper Trap             Middle Trap             Rhomboids        3 3   Low Trap                      Assessment:The patient is a 75 y/o male that originally presented to therapy with a c/o right shoulder pain. The patient presents with improvements in right shoulder ROM and strength. The patient has met all therapy goals. The patient's golf swing was assessed and recommended to widen his stance to improve balance and allow his LE's to produce greater force to minimize EU effort. The patient will be discharged from therapy due to meeting all therapy goals.      STG: (to be met in 3 treatments)  1. ? Pain: Patient will decrease pain to < or = to 1/10.                     Met  2. ? ROM: Patient will demonstrate pain free symmetrical UE ROM                     Met  3. ? Strength: Patient will demonstrate bilateral rhomboid strength to < or = to 4/5. Met   4. ? Function: Patient will be able to return to recreational exercise.                     Progress: has not returned to gym but feels comfortable to return   5. Patient to be independent with home exercise program as demonstrated by performance with correct form without cues. Met  6. Demonstrate Knowledge of fall prevention  LTG: (to be met in 6 treatments)  1. Patient will be able to complete > or = to 25 golf swings without pain. Met  2. Patient will decrease UEFI score to < or = to 18% to improve QOL.                            Met: 5%  Treatment to Date:  [x] Therapeutic Exercise    [] Modalities:  [x] Therapeutic Activity    [] Ultrasound  [] Electrical Stimulation  [] Gait Training     [] Massage       [] Lumbar/Cervical Traction  [] Neuromuscular Re-education [] Cold/hotpack [] Iontophoresis: 4 mg/mL  [] Instruction in Home Exercise Program                     Dexamethasone Sodium  [] Manual Therapy             Phosphate 40-80 mAmin  [] Aquatic Therapy                   [x] Vasocompression/    [] Other:             Game Ready    Discharge Status:     [x] Pt recovered from conditions. Treatment goals were met. [] Pt received maximum benefit. No further therapy indicated at this time. [] Pt to continue exercise/home instructions independently. [] Therapy interrupted due to:    [] Pt has 2 or more no shows/cancels, is discontinued per our policy. [] Pt has completed prescribed number of treatment sessions. [] Other:         Electronically signed by Rozina Temple PT on 5/11/2022 at 11:56 AM      If you have any questions or concerns, please don't hesitate to call.   Thank you for your referral.

## 2022-05-11 NOTE — PROGRESS NOTES
[] [facility]) - [facility]  Therapy  [address]  [phone]  F: [phone] [] [address]  [facility]   [address]  P: [phone]  F: [phone] [] [address]  Therapy  [address] [facility]  P: [phone]  F: [phone] [x] [address]  P: [phone]  F: [phone] [] [address]  [facility]   [address]   [place]: [phone]  F: [phone]      Physical Therapy Progress Note    Date: 2022      Patient: Viv Gamble  : 1945  MRN: [id number]    Physician: Nancy Bangura          Insurance: MEDICARE  Medical Diagnosis:   S70. 01XA (ICD-10-CM) - Contusion of right hip, initial encounter      S70.11XA (ICD-10-CM) - Contusion of right thigh, initial encounter  S40.011A (ICD-10-CM) - Contusion of right shoulder, initial encounter  Rehab Codes: M25. 511  Onset date: 3/31/2022                         Next 's appt.: N/A  Visit# / total visits: ; Progress note for Medicare patient due at visit 10                          Cancels/No Shows: 0/0      Subjective:  Pain:  [] Yes  [] No  Location: Right Shoulder Pain Rating: (0-10 scale) 0 /10  Pain altered Tx:  [] No  [] Yes  Action:  Comments: Patient states that he has not been experiencing any pain but does have mild stiffness in the morning.  When the pain does occur he is able to completing     Objective:       ROM  °A/P END FEEL STRENGTH     Left Right   Left Right   Cervical              Flexion WNL           Extension WNL           Rotation WNL WNL         Side Bending WNL WNL         Upper Extremity             Shoulder flexion 150 150   4+ 4+   Shoulder Abduction 150 150   4+ 4+   Shoulder Extension             Shoulder IR T10 T8   4+ 4+   Shoulder ER 50 50   4+ 4+   Scapular              Upper Trap             Middle Trap             Rhomboids        3 3   Low Trap                      Assessment:  The patient is a 77 y/o male that originally presented to therapy with a c/o right shoulder pain. The patient presents with improvements in right shoulder ROM and strength. The patient has met all therapy goals. The patient's golf swing was assessed and recommended to widen his stance to improve balance and allow his LE's to produce greater force to minimize EU effort. The patient will be discharged from therapy due to meeting all therapy goals. STG: (to be met in 3 treatments)  1. ? Pain: Patient will decrease pain to < or = to 1/10.     Met  2. ? ROM: Patient will demonstrate pain free symmetrical UE ROM     Met  3. ? Strength: Patient will demonstrate bilateral rhomboid strength to < or = to 4/5. Met   4. ? Function: Patient will be able to return to recreational exercise.     Progress: has not returned to gym but feels comfortable to return   5. Patient to be independent with home exercise program as demonstrated by performance with correct form without cues. Met  6. Demonstrate Knowledge of fall prevention  LTG: (to be met in 6 treatments)  1. Patient will be able to complete > or = to 25 golf swings without pain. Met  2. Patient will decrease UEFI score to < or = to 18% to improve QOL.      Met     Treatment:      Modalities:   Precautions:  Exercises:  Exercise Reps/ Time Weight/ Level Comments Completed    Warm - up           Pulley 2 / 2    Flexion / Abduction     UBE 3 / 3 3   x               Supine           Wand flexion 2 x 10     -   Wand ER 2 x 10     -   Single knee to chest 2 x 30\"     -   Piriformis stretch 2 x 30\"     -   Hip adduction isometric  2 x 10 3\"   -   Hip abduction isometric  2 x 10 3\"   -   Lumbar rotation 2 x 10                     Side lying            ER 2 x 10 2#   x   Abd  2 x 10 2#   x   Horz Abd 2 x 10 2#   x   Flex  2 x 10 2#   x Seated            Scapular retraction 2 x 10 3\"   -   Thoracic rotation with prachi  2 x 10     x               Standing            Rows  2 x 10 Yellow   x   Shoulder Extension  2 x 10 Yellow   x   Row with thoracic rotation 2 x 10 red   -   Trunk rotation  2 x 10 Red    x      HEP:  Access Code: [email or web address]  URL: [email or web address]  Date: 05/11/2022  Prepared by: Shey Garrido    Program Notes  Add a soup can to the exercises that we are doing on our side       Exercises  Sidelying Shoulder External Rotation - 1 x daily - 7 x weekly - 2 sets - 15 reps  Standing Trunk Rotation with Resistance - 1 x daily - 7 x weekly - 3 sets - 10 reps  Standing Shoulder Row with Anchored Resistance - 1 x daily - 7 x weekly - 2 sets - 15 reps  Shoulder extension with resistance - Neutral - 1 x daily - 7 x weekly - 2 sets - 15 reps  Supine Figure 4 Piriformis Stretch - 1 x daily - 7 x weekly - 3 sets - 10 reps  Clamshell - 1 x daily - 7 x weekly - 3 sets - 10 reps  Sidelying Shoulder Abduction Palm Forward - 1 x daily - 7 x weekly - 2 sets - 15 reps  Sidelying Shoulder Horizontal Abduction - 1 x daily - 7 x weekly - 2 sets - 15 reps  Sidelying Shoulder Flexion 15 Degrees - 1 x daily - 7 x weekly - 2 sets - 15 reps  Supine Lower Trunk Rotation - 1 x daily - 7 x weekly - 2 sets - 10 reps  Seated thoracic rotation - 1 x daily - 7 x weekly - 2 sets - 10 reps           Specific Instructions for next treatment: Continue side lying progression, continue standing scapular strengthening, continue thoracic rotation (add resistance), address hip as needed.        Other:      Treatment Charges: Mins Units   []  Modalities     []  Ther Exercise     []  Manual Therapy     [x]  Ther Activities 30 2   []  Aquatics     []  Vasocompression     []  Other     Total Treatment time 30 2     Medicare: $625.25 5/11/2022     Pt.  Education:  [x] Yes  [] No  [x] Reviewed Prior HEP/Ed  Method of Education: [x] Verbal  [x] Demo  [x] Written    Access Code: [email or web address]  URL: [email or web address]  Date: 04/27/2022  Prepared by: Tamala Collet    Program Notes  Add a soup can to the exercises that we are doing on our side       Exercises  Sidelying Shoulder External Rotation - 1 x daily - 7 x weekly - 2 sets - 10 reps  Sidelying Shoulder Abduction Palm Forward - 1 x daily - 7 x weekly - 2 sets - 10 reps  Sidelying Shoulder Horizontal Abduction - 1 x daily - 7 x weekly - 2 sets - 10 reps  Sidelying Shoulder Flexion 15 Degrees - 1 x daily - 7 x weekly - 2 sets - 10 reps  Standing Shoulder Row with Anchored Resistance - 1 x daily - 7 x weekly - 2 sets - 15 reps  Shoulder extension with resistance - Neutral - 1 x daily - 7 x weekly - 2 sets - 15 reps  Supine Lower Trunk Rotation - 1 x daily - 7 x weekly - 2 sets - 10 reps  Supine Piriformis Stretch with Foot on Ground - 1 x daily - 7 x weekly - 3 sets - 30 sec hold  Seated thoracic rotation - 1 x daily - 7 x weekly - 2 sets - 10 reps  Tandem Stance in Corner - 1 x daily - 7 x weekly - 3 sets - 10 reps      Comprehension of Education:  [x] Verbalizes understanding. [x] Demonstrates understanding. [] Needs review. [x] Demonstrates/verbalizes HEP/Ed previously given. Plan: [x] Continue current frequency toward long and short term goals.     [x] Specific Instructions for subsequent treatments: D/c      Time In: 11:05 am            Time Out: 11:40 am      Treatment Plan:  [] Therapeutic Exercise   61137  [] Iontophoresis: 4 mg/mL Dexamethasone Sodium Phosphate  mAmin  91206   [] Therapeutic Activity  41997 [] Vasopneumatic cold with compression  60800    [] Gait Training   77733 [] Ultrasound   87122   [] Neuromuscular Re-education  25932 [] Electrical Stimulation Unattended  26868   [] Manual Therapy  97147 [] Electrical Stimulation Attended  98310   [] Instruction in HEP  [] Lumbar/Cervical Traction  12895   [] Aquatic Therapy   17087 [] Cold/hotpack    [] Massage   23951      [] Dry Needling, 1 or 2 muscles  [id number]   [] Biofeedback, first 15 minutes   18725  [] Biofeedback, additional 15 minutes   76201 [] Dry Needling, 3 or more muscles  98290       Patient Status:     [] Continue per initial plan of care. [] Additional visits necessary. [x] Other: Discharge from therapy      Requested Frequency/Duration: D/c          Electronically signed by Pineda Andino PT on 5/11/2022 at 11:09 AM      If you have any questions or concerns, please don't hesitate to call. Thank you for your referral.    Physician Signature:________________________________Date:__________________  By signing above or cosigning this note, I have reviewed this plan of care and certify a need for medically necessary rehabilitation services.      *PLEASE SIGN ABOVE AND FAX BACK ALL PAGES*